# Patient Record
Sex: MALE | Race: WHITE
[De-identification: names, ages, dates, MRNs, and addresses within clinical notes are randomized per-mention and may not be internally consistent; named-entity substitution may affect disease eponyms.]

---

## 2018-08-22 ENCOUNTER — HOSPITAL ENCOUNTER (EMERGENCY)
Dept: HOSPITAL 25 - ED | Age: 28
Discharge: HOME | End: 2018-08-22
Payer: SELF-PAY

## 2018-08-22 DIAGNOSIS — F17.200: ICD-10-CM

## 2018-08-22 DIAGNOSIS — F19.129: Primary | ICD-10-CM

## 2018-08-22 LAB
BASOPHILS # BLD AUTO: 0.1 10^3/UL (ref 0–0.2)
EOSINOPHIL # BLD AUTO: 0.1 10^3/UL (ref 0–0.6)
HCT VFR BLD AUTO: 45 % (ref 42–52)
HGB BLD-MCNC: 15.1 G/DL (ref 14–18)
LYMPHOCYTES # BLD AUTO: 2.9 10^3/UL (ref 1–4.8)
MCH RBC QN AUTO: 31 PG (ref 27–31)
MCHC RBC AUTO-ENTMCNC: 34 G/DL (ref 31–36)
MCV RBC AUTO: 91 FL (ref 80–94)
MONOCYTES # BLD AUTO: 0.7 10^3/UL (ref 0–0.8)
NEUTROPHILS # BLD AUTO: 5.3 10^3/UL (ref 1.5–7.7)
NRBC # BLD AUTO: 0 10^3/UL
NRBC BLD QL AUTO: 0.1
PLATELET # BLD AUTO: 311 10^3/UL (ref 150–450)
RBC # BLD AUTO: 4.91 10^6/UL (ref 4–5.4)
WBC # BLD AUTO: 9.1 10^3/UL (ref 3.5–10.8)

## 2018-08-22 PROCEDURE — 80053 COMPREHEN METABOLIC PANEL: CPT

## 2018-08-22 PROCEDURE — 99284 EMERGENCY DEPT VISIT MOD MDM: CPT

## 2018-08-22 PROCEDURE — 80320 DRUG SCREEN QUANTALCOHOLS: CPT

## 2018-08-22 PROCEDURE — 80329 ANALGESICS NON-OPIOID 1 OR 2: CPT

## 2018-08-22 PROCEDURE — 36415 COLL VENOUS BLD VENIPUNCTURE: CPT

## 2018-08-22 PROCEDURE — 84443 ASSAY THYROID STIM HORMONE: CPT

## 2018-08-22 PROCEDURE — G0480 DRUG TEST DEF 1-7 CLASSES: HCPCS

## 2018-08-22 PROCEDURE — 85025 COMPLETE CBC W/AUTO DIFF WBC: CPT

## 2018-08-22 NOTE — UC
Psychiatric Complaint HPI





- History Of Current Complaint


Stated Complaint: 941


Time Seen by Provider: 08/22/18 12:25





PMH/Surg Hx/FS Hx/Imm Hx





- Social History


Alcohol Use: None


Substance Use Type: None


Smoking Status (MU): Heavy Every Day Tobacco Smoker





Physical Exam


Triage Information Reviewed: Yes


Vital Signs Reviewed: Yes





Discharge





- Discharge Plan


Referrals: 


No Primary Care Phys,NOPCP [Primary Care Provider] - 





- Attestation Statements


Document Initiated by Scribe: Yes


Documenting Scribe: Felicia Ricci


Provider For Whom Scribe is Documenting (Include Credential): Boris Garcia MD


Scribe Attestation: 


IFelicia, scribed for Boris Garcia MD on 08/22/18 at 1229. 


Scribe Documentation Reviewed: Yes


Provider Attestation: 


The documentation as recorded by the Felicia canada accurately reflects 

the service I personally performed and the decisions made by me, Boris Garcia MD

## 2018-08-22 NOTE — ED
Psychiatric Complaint





- HPI Summary


HPI Summary: 


The patient is a 27 y/o M [facility] [employer] presenting to [facility] with a chief 

complaint of EtOH intoxication, possible cocaine and marijuana use, and 

potential HI. Per police report, the pt had been running around the streets in 

front of cars and screaming. There were multiple reports called in for him, but 

he was unable to be caught until he was home and the pt's roommate called after 

he was throwing things around the house and stated he wanted to hurt his 

roommate. When police presented themselves in the pt's home, he ran out of the 

house. The pt had gotten to his neighbor's house, where he was once again 

throwing things, although he was compliant when handcuffed. Pt states that when 

he woke up this morning, his cousin/roommate came into his room and told him 

that he had to gather his things and leave the house. The pt went to work as he 

was supposed to, but he was assigned a job he did not want to do, causing him 

to become more upset. Because of this, he decided to pour tequila into his 

coffee, allowing him to become EtOH intoxicated. He also reports taking 

marijuana and cocaine but did not make it clear if that was today. He states 

that he does not want to hurt himself or anyone else. He also reports scars all 

over his body.





- History Of Current Complaint


Chief Complaint: EDMentalHealth


Time Seen by Provider: 08/22/18 12:25


Hx Obtained From: Patient


Onset/Duration: Sudden Onset, Lasting Hours, Still Present


Timing: Hours


Severity Initially: Severe


Severity Currently: Severe


Character: Manic


Aggravating Factor(s): Alcohol Use, Drug Use - cocaine, marijuana


Alleviating Factor(s): Nothing


Associated Signs And Symptoms: Positive: Hostile


Has Suicidal: Denies: Thoughts


Has Homicidal: Denies: Thoughts, With A Plan - but per police report, pt was 

having HI


Recent Stressor(s): told he had to leave his house


Ingestion History: Type/Name Of Drug - cocaine, marijuana





- Allergies/Home Medications


Allergies/Adverse Reactions: 


 Allergies











Allergy/AdvReac Type Severity Reaction Status Date / Time


 


No Known Allergies Allergy   Verified 08/22/18 13:08











Home Medications: 


 Home Medications





Unobtainable  08/22/18 [History Confirmed 08/22/18]











PMH/Surg Hx/FS Hx/Imm Hx


Opthamlomology History: 


   Denies: Hx Legally Blind


EENT History: 


   Denies: Hx Deafness





- Immunization History


Immunizations Up to Date: Unable to Obtain/Confirm


Infectious Disease History: Unable to Obtain/Confirm


Infectious Disease History: 


   Denies: Traveled Outside the [place] in Last 30 Days





- Family History


Known Family History: 


   Negative: Hypertension





- Social History


Alcohol Use: None


Substance Use Type: Reports: Cocaine, Marijuana, Other


Substance Use Comment - Amount & Last Used: (+)Alcohol


Smoking Status (MU): Heavy Every Day Tobacco Smoker





Review of Systems


Positive: Other - scars all over body


Neurological: Other - EtOH intoxication


Positive: Other - HI, marijuana and cocaine use


All Other Systems Reviewed And Are Negative: Yes





Physical Exam





- Summary


Physical Exam Summary: 


Constitutional: Well-developed, Well-nourished, Alert. (-) Distressed


Skin: Warm, Dry


HENT: Normocephalic; Atraumatic


Eyes: Conjunctiva normal


Neck: Musculoskeletal ROM normal neck. (-) JVD, (-) Stridor, (-) Tracheal 

deviation


Cardio: Rhythm regular, rate normal, Heart sounds normal; Intact distal pulses; 

The pedal pulses are 2+ and symmetric. Radial pulses are 2+ and symmetric. (-) 

Murmur


Pulmonary/Chest wall: Effort normal. (-) Respiratory distress, (-) Wheezes, (-) 

Rales


Abd: Soft, (-) epigastric tenderness, (-) Distension, (-) Guarding, (-) Rebound


Musculoskeletal: (-) Edema


Lymph: (-) Cervical adenopathy


Neuro: Alert, Oriented x3


Psych: Mood and affect Normal


Triage Information Reviewed: Yes


Vital Signs On Initial Exam: 


 Initial Vitals











Pulse Resp BP Pulse Ox


 


 79   16   101/62   93 


 


 08/22/18 13:07  08/22/18 13:07  08/22/18 13:07  08/22/18 13:07











Vital Signs Reviewed: Yes





Diagnostics





- Vital Signs


 Vital Signs











  Temp Pulse Resp BP Pulse Ox


 


 08/22/18 13:56   82   98/81  98


 


 08/22/18 13:55   87   108/83  97


 


 08/22/18 13:42   97   121/68  98


 


 08/22/18 13:29   82    93


 


 08/22/18 13:28   83   96/57  92


 


 08/22/18 13:09  98 F  83  18  101/62  93


 


 08/22/18 13:07   79  16  101/62  93














- Laboratory


Lab Results: 


 Lab Results











  08/22/18 08/22/18 Range/Units





  12:51 12:51 


 


WBC  9.1   (3.5-10.8)  10^3/ul


 


RBC  4.91   (4.00-5.40)  10^6/ul


 


Hgb  15.1   (14.0-18.0)  g/dl


 


Hct  45   (42-52)  %


 


MCV  91   (80-94)  fL


 


MCH  31   (27-31)  pg


 


MCHC  34   (31-36)  g/dl


 


RDW  14   (10.5-15)  %


 


Plt Count  311   (150-450)  10^3/ul


 


MPV  7.3 L   (7.4-10.4)  um3


 


Neut % (Auto)  58.6   (38-83)  %


 


Lymph % (Auto)  31.4   (25-47)  %


 


Mono % (Auto)  8.2 H   (0-7)  %


 


Eos % (Auto)  1.1   (0-6)  %


 


Baso % (Auto)  0.7   (0-2)  %


 


Absolute Neuts (auto)  5.3   (1.5-7.7)  10^3/ul


 


Absolute Lymphs (auto)  2.9   (1.0-4.8)  10^3/ul


 


Absolute Monos (auto)  0.7   (0-0.8)  10^3/ul


 


Absolute Eos (auto)  0.1   (0-0.6)  10^3/ul


 


Absolute Basos (auto)  0.1   (0-0.2)  10^3/ul


 


Absolute Nucleated RBC  0   10^3/ul


 


Nucleated RBC %  0.1   


 


Sodium   142  (135-145)  mmol/L


 


Potassium   3.7  (3.5-5.0)  mmol/L


 


Chloride   109  (101-111)  mmol/L


 


Carbon Dioxide   24  (22-32)  mmol/L


 


Anion Gap   9  (2-11)  mmol/L


 


BUN   10  (6-24)  mg/dL


 


Creatinine   0.92  (0.67-1.17)  mg/dL


 


Est GFR ( Amer)   117.7  (>60)  


 


Est GFR (Non-Af Amer)   97.3  (>60)  


 


BUN/Creatinine Ratio   10.9  (8-20)  


 


Glucose   95  ()  mg/dL


 


Calcium   9.6  (8.6-10.3)  mg/dL


 


Total Bilirubin   0.50  (0.2-1.0)  mg/dL


 


AST   35  (13-39)  U/L


 


ALT   23  (7-52)  U/L


 


Alkaline Phosphatase   65  ()  U/L


 


Total Protein   7.0  (6.4-8.9)  g/dL


 


Albumin   4.4  (3.2-5.2)  g/dL


 


Globulin   2.6  (2-4)  g/dL


 


Albumin/Globulin Ratio   1.7  (1-3)  


 


TSH   Pending  


 


Salicylates   < 2.50  (<30)  mg/dL


 


Acetaminophen   < 15  mcg/mL


 


Serum Alcohol   292 H  (<10)  mg/dL











Result Diagrams: 


 08/22/18 12:51





 08/22/18 12:51


Lab Statement: Any lab studies that have been ordered have been reviewed, and 

results considered in the medical decision making process.





Re-Evaluation





- Re-Evaluation


  ** First Eval


Re-Evaluation Time: 13:00


Change: Unchanged


Comment: Patient is now calm and has responded well to medication. I asked that 

the restraints be removed at 13:15.





  ** Second Eval


Re-Evaluation Time: 13:30


Change: Unchanged


Comment: Coming time to remove restraints, the patient is not cooperative and 

appears to be agitated. The restraints will be continued.





  ** Third Eval


Re-Evaluation Time: 17:30


Change: Improved


Comment: Patient seems comfortable. The decision has been made to remove the 

restraints.





  ** Fourth Eval


Re-Evaluation Time: 18:05


Change: Unchanged


Comment: Patient is sleepy and unresponsive.





Course/Dx





- Course


Course Of Treatment: The patient is a 27 y/o M [facility] [employer] presenting to 

[facility] with a chief complaint of EtOH intoxication, possible cocaine and 

marijuana use, and potential HI. Per police report, the pt had been running 

around the streets in front of cars and screaming. There were multiple reports 

called in for him, but he was unable to be caught until he was home and the pt'

s roommate called after he was throwing things around the house and stated he 

wanted to hurt his roommate. When police presented themselves in the pt's home, 

he ran out of the house. The pt had gotten to his neighbor's house, where he 

was once again throwing things, although he was compliant when handcuffed. Pt 

states that when he woke up this morning, his cousin/roommate came into his 

room and told him that he had to gather his things and leave the house. The pt 

went to work as he was supposed to, but he was assigned a job he did not want 

to do, causing him to become more upset. Because of this, he decided to pour 

tequila into his coffee, allowing him to become EtOH intoxicated. He also 

reports taking marijuana and cocaine but did not make it clear if that was 

today. He states that he does not want to hurt himself or anyone else. He also 

reports scars all over his body.  Upon arrival, pt is obviously upset and 

somewhat hostile with swearing. In the ED course, he was administered Ativan 

and Haldol.  Pt was put in ankle and wrist restraints at 1235. Restraints were 

to be taken off at 1315 but pt was not cooperative. Restraints removed 

officially at 17:30. Patient will be a sign-out to Dr. Grimes at shift change 

pending disposition after [facility].





Discharge





- Sign-Out/Discharge


Documenting (check all that apply): Sign-Out Patient - Patient will be a sign-

out to Dr. Grimes at shift change pending disposition after [facility]HU.


Signing out patient TO: Eduard Grimes





- Discharge Plan


Referrals: 


No Primary Care Phys,NOPCP [Primary Care Provider] - 





- Attestation Statements


Document Initiated by Gavinibe: Yes


Documenting Scribe: Felicia Ricci


Provider For Whom Klaus is Documenting (Include Credential): Boris Garcai MD


Scribe Attestation: 


I, Felicia Ricci, scribed for Boris Garcia MD on 08/22/18 at 1912. 


Scribe Documentation Reviewed: Yes


Provider Attestation: 


The documentation as recorded by the Felicia canada accurately reflects 

the service I personally performed and the decisions made by me, Boris Garcia MD

## 2018-08-23 VITALS — SYSTOLIC BLOOD PRESSURE: 100 MMHG | DIASTOLIC BLOOD PRESSURE: 68 MMHG

## 2018-08-26 NOTE — ED
Progress





- Consult/PCP


Time Called: 20:00





Re-Evaluation





- Re-Evaluation


  ** First Eval


Re-Evaluation Time: 13:00


Change: Unchanged


Comment: Patient is now calm and has responded well to medication. I asked that 

the restraints be removed at 13:15.





  ** Second Eval


Re-Evaluation Time: 13:30


Change: Unchanged


Comment: Coming time to remove restraints, the patient is not cooperative and 

appears to be agitated. The restraints will be continued.





  ** Third Eval


Re-Evaluation Time: 17:30


Change: Improved


Comment: Patient seems comfortable. The decision has been made to remove the 

restraints.





  ** Fourth Eval


Re-Evaluation Time: 18:05


Change: Unchanged


Comment: Patient is sleepy and unresponsive.





Course/Dx





- Course


Course Of Treatment: The patient is a 29 y/o M [facility] [employer] presenting to 

[facility] with a chief complaint of EtOH intoxication, possible cocaine and 

marijuana use, and potential HI. Per police report, the pt had been running 

around the streets in front of cars and screaming. There were multiple reports 

called in for him, but he was unable to be caught until he was home and the pt'

s roommate called after he was throwing things around the house and stated he 

wanted to hurt his roommate. When police presented themselves in the pt's home, 

he ran out of the house. The pt had gotten to his neighbor's house, where he 

was once again throwing things, although he was compliant when handcuffed. Pt 

states that when he woke up this morning, his cousin/roommate came into his 

room and told him that he had to gather his things and leave the house. The pt 

went to work as he was supposed to, but he was assigned a job he did not want 

to do, causing him to become more upset. Because of this, he decided to pour 

tequila into his coffee, allowing him to become EtOH intoxicated. He also 

reports taking marijuana and cocaine but did not make it clear if that was 

today. He states that he does not want to hurt himself or anyone else. He also 

reports scars all over his body.  Upon arrival, pt is obviously upset and 

somewhat hostile with swearing. In the ED course, he was administered Ativan 

and Haldol.  Pt was put in ankle and wrist restraints at 1235. Restraints were 

to be taken off at 1315 but pt was not cooperative. Restraints removed 

officially at 17:30. Patient will be a sign-out to Dr. Grimes at shift change 

pending disposition after [facility]HU.





- Diagnoses


Provider Diagnoses: 


 Polysubstance abuse








Discharge





- Sign-Out/Discharge


Documenting (check all that apply): Patient Departure





- Discharge Plan


Condition: Good


Disposition: HOME


Referrals: 


No Primary Care Phys,NOPCP [Primary Care Provider] - 





- Billing Disposition and Condition


Condition: GOOD


Disposition: Home





- Attestation Statements


Document Initiated by Scribe: No

## 2018-11-01 ENCOUNTER — HOSPITAL ENCOUNTER (EMERGENCY)
Dept: HOSPITAL 25 - ED | Age: 28
LOS: 1 days | Discharge: HOME | End: 2018-11-02
Payer: SELF-PAY

## 2018-11-01 DIAGNOSIS — F19.90: Primary | ICD-10-CM

## 2018-11-01 DIAGNOSIS — F17.200: ICD-10-CM

## 2018-11-01 LAB
BASOPHILS # BLD AUTO: 0.1 10^3/UL (ref 0–0.2)
EOSINOPHIL # BLD AUTO: 0.1 10^3/UL (ref 0–0.6)
HCT VFR BLD AUTO: 42 % (ref 42–52)
HGB BLD-MCNC: 14.3 G/DL (ref 14–18)
LYMPHOCYTES # BLD AUTO: 3.9 10^3/UL (ref 1–4.8)
MCH RBC QN AUTO: 31 PG (ref 27–31)
MCHC RBC AUTO-ENTMCNC: 34 G/DL (ref 31–36)
MCV RBC AUTO: 92 FL (ref 80–94)
MONOCYTES # BLD AUTO: 1.3 10^3/UL (ref 0–0.8)
NEUTROPHILS # BLD AUTO: 4.9 10^3/UL (ref 1.5–7.7)
NRBC # BLD AUTO: 0 10^3/UL
NRBC BLD QL AUTO: 0
PLATELET # BLD AUTO: 330 10^3/UL (ref 150–450)
RBC # BLD AUTO: 4.63 10^6/UL (ref 4–5.4)
WBC # BLD AUTO: 10.3 10^3/UL (ref 3.5–10.8)

## 2018-11-01 PROCEDURE — 99285 EMERGENCY DEPT VISIT HI MDM: CPT

## 2018-11-01 PROCEDURE — 36415 COLL VENOUS BLD VENIPUNCTURE: CPT

## 2018-11-01 PROCEDURE — 85025 COMPLETE CBC W/AUTO DIFF WBC: CPT

## 2018-11-01 PROCEDURE — 84443 ASSAY THYROID STIM HORMONE: CPT

## 2018-11-01 PROCEDURE — 80053 COMPREHEN METABOLIC PANEL: CPT

## 2018-11-01 PROCEDURE — 80307 DRUG TEST PRSMV CHEM ANLYZR: CPT

## 2018-11-01 PROCEDURE — G0480 DRUG TEST DEF 1-7 CLASSES: HCPCS

## 2018-11-01 PROCEDURE — 81003 URINALYSIS AUTO W/O SCOPE: CPT

## 2018-11-01 PROCEDURE — 80329 ANALGESICS NON-OPIOID 1 OR 2: CPT

## 2018-11-01 PROCEDURE — 80320 DRUG SCREEN QUANTALCOHOLS: CPT

## 2018-11-01 NOTE — ED
Psychiatric Complaint





- HPI Summary


HPI Summary: 


Level  5 Caveat:  Unable to obtain complete HPI due to AMS secondary to EtOH 

use 





The pt is a 27 y/o male BIBA to [facility] c/o SI since today evening.  The pt got 

into an argument with his cousin who called the police and ambulance after the 

pt broke glass and threatened to kill himself  if not given crack. The pt 

reports consumption of 6-10 alcoholic drinks, smoking marijuana and crack 

today. He lives with his cousin in [place] and has a brother and sister in the 

area. He notes palpitations but denies SI/HI at bedside. Pt insists on going 

home. 





- History Of Current Complaint


Chief Complaint: EDMentalHealth


Time Seen by Provider: 11/01/18 20:53


Hx Obtained From: Patient


Hx From Patient Unobtainable Due To: Altered Mental Status


Onset/Duration: Sudden Onset, Still Present


Timing: Constant


Character: Manic


Aggravating Factor(s): Alcohol Use, Drug Use


Alleviating Factor(s): Nothing


Has Suicidal: Reports: Thoughts - Denies at bedside.  Denies: With A Plan


Has Homicidal: Denies: Thoughts, With A Plan


Ingestion History: Type/Name Of Drug - EtOH (6-10 botttles, "crack" and 

marijuana





- Allergies/Home Medications


Allergies/Adverse Reactions: 


 Allergies











Allergy/AdvReac Type Severity Reaction Status Date / Time


 


No Known Allergies Allergy   Verified 08/22/18 13:08














PMH/Surg Hx/FS Hx/Imm Hx


Previously Healthy: No - Level  5 Caveat:  Unable to obtain complete Mhx due to 

AMS secondary to EtO


Endocrine/Hematology History: 


   Denies: Hx Diabetes


Cardiovascular History: 


   Denies: Hx Hypertension


Respiratory History: 


   Denies: Hx Asthma


Sensory History: 


   Denies: Hx Legally Blind, Hx Deafness


Opthamlomology History: 


   Denies: Hx Legally Blind


Psychiatric History: Reports: Hx of Violent Episodes Against Others


   Denies: Hx Eating Disorder





- Cancer History


Cancer Type, Location and Year: None reported


Infectious Disease History: No


Infectious Disease History: 


   Denies: Traveled Outside the [place] in Last 30 Days





- Family History


Known Family History: 


   Negative: Hypertension





- Social History


Occupation: Employed Full-time


Lives: With Family - Cousin


Alcohol Use: Daily


Hx Substance Use: Yes


Substance Use Type: Reports: Cocaine, Marijuana


Substance Use Comment - Amount & Last Used: marijuana every day and crack every 

weekend


Smoking Status (MU): Heavy Every Day Tobacco Smoker





Review of Systems





- ROS Summary


Review of Systems Summary: 


Level  5 Caveat:  Unable to obtain complete ROS due to AMS secondary to EtOH 

use 


Positive: Palpitations


Positive: Other - Negative: SI/HI 


All Other Systems Reviewed And Are Negative: No





Physical Exam





- Summary


Physical Exam Summary: 


Level  5 Caveat:  Unable to obtain complete PE due to AMS secondary to EtOH use 





GENERAL:  Patient is a well developed and nourished M who is lying comfortable 

in the stretcher.  Patient is not in any acute respiratory distress.


HEAD AND FACE: Normocephalic


EYES: PERRLA, EOMI x 2.


EARS: Hearing grossly intact.


MOUTH: Oropharynx within normal limits.


NECK: Supple, trachea is midline, no adenopathy, no JVD, no carotid bruit.


CHEST: Symmetric, no tenderness at palpation


LUNGS: Clear to auscultation bilaterally. No wheezing or crackles.


CVS: Regular rate and rhythm, S1 and S2 present, no murmurs or gallops 

appreciated.


ABDOMEN: Soft, non-tender. Bowel sounds are normal. No abdominal abnormal 

pulsations.


EXTREMITIES: Full ROM in all major joints, no edema, no cyanosis or clubbing.


NEURO: Alert and oriented x 3. No acute neurological deficits. Speech is normal 

and follows commands.


SKIN: Dry and warm


PSYCH: Patient appears manic with linear thought and pressured speech.


Triage Information Reviewed: Yes


Vital Signs On Initial Exam: 


 Initial Vitals











Temp Pulse Resp BP Pulse Ox


 


 98.0 F   71   16   109/78   100 


 


 11/01/18 20:44  11/01/18 20:44  11/01/18 20:44  11/01/18 20:44  11/01/18 20:44











Vital Signs Reviewed: Yes


Completion Of Physical Exam Limited Due To: Altered Mental Status





Diagnostics





- Vital Signs


 Vital Signs











  Temp Pulse Resp BP Pulse Ox


 


 11/01/18 20:44  98.0 F  71  16  109/78  100














- Laboratory


Lab Results: 


 Lab Results











  11/01/18 11/01/18 11/01/18 Range/Units





  21:04 21:04 21:09 


 


WBC    10.3  (3.5-10.8)  10^3/ul


 


RBC    4.63  (4.00-5.40)  10^6/ul


 


Hgb    14.3  (14.0-18.0)  g/dl


 


Hct    42  (42-52)  %


 


MCV    92  (80-94)  fL


 


MCH    31  (27-31)  pg


 


MCHC    34  (31-36)  g/dl


 


RDW    14  (10.5-15)  %


 


Plt Count    330  (150-450)  10^3/ul


 


MPV    7.5  (7.4-10.4)  um3


 


Neut % (Auto)    47.6  (38-83)  %


 


Lymph % (Auto)    37.9  (25-47)  %


 


Mono % (Auto)    13.0 H  (0-7)  %


 


Eos % (Auto)    0.9  (0-6)  %


 


Baso % (Auto)    0.6  (0-2)  %


 


Absolute Neuts (auto)    4.9  (1.5-7.7)  10^3/ul


 


Absolute Lymphs (auto)    3.9  (1.0-4.8)  10^3/ul


 


Absolute Monos (auto)    1.3 H  (0-0.8)  10^3/ul


 


Absolute Eos (auto)    0.1  (0-0.6)  10^3/ul


 


Absolute Basos (auto)    0.1  (0-0.2)  10^3/ul


 


Absolute Nucleated RBC    0  10^3/ul


 


Nucleated RBC %    0  


 


Sodium     (135-145)  mmol/L


 


Potassium     (3.5-5.0)  mmol/L


 


Chloride     (101-111)  mmol/L


 


Carbon Dioxide     (22-32)  mmol/L


 


Anion Gap     (2-11)  mmol/L


 


BUN     (6-24)  mg/dL


 


Creatinine     (0.67-1.17)  mg/dL


 


Est GFR ( Amer)     (>60)  


 


Est GFR (Non-Af Amer)     (>60)  


 


BUN/Creatinine Ratio     (8-20)  


 


Glucose     ()  mg/dL


 


Calcium     (8.6-10.3)  mg/dL


 


Total Bilirubin     (0.2-1.0)  mg/dL


 


AST     (13-39)  U/L


 


ALT     (7-52)  U/L


 


Alkaline Phosphatase     ()  U/L


 


Total Protein     (6.4-8.9)  g/dL


 


Albumin     (3.2-5.2)  g/dL


 


Globulin     (2-4)  g/dL


 


Albumin/Globulin Ratio     (1-3)  


 


TSH     


 


Urine Color  Straw    


 


Urine Appearance  Clear    


 


Urine pH  6.0    (5-9)  


 


Ur Specific Gravity  1.006 L    (1.010-1.030)  


 


Urine Protein  Negative    (Negative)  


 


Urine Ketones  Negative    (Negative)  


 


Urine Blood  Negative    (Negative)  


 


Urine Nitrate  Negative    (Negative)  


 


Urine Bilirubin  Negative    (Negative)  


 


Urine Urobilinogen  Negative    (Negative)  


 


Ur Leukocyte Esterase  Negative    (Negative)  


 


Urine Glucose  Negative    (Negative)  


 


Salicylates     (<30)  mg/dL


 


Urine Opiates Screen   None detected   (None Detect)  


 


Acetaminophen     mcg/mL


 


Ur Barbiturates Screen   None detected   (None Detect)  


 


Ur Phencyclidine Scrn   None detected   (None Detect)  


 


Ur Amphetamines Screen   None detected   (None Detect)  


 


U Benzodiazepines Scrn   None detected   (None Detect)  


 


Urine Cocaine Screen   Presumptive positive A   (None Detect)  


 


U Cannabinoids Screen   Presumptive positive A   (None Detect)  


 


Serum Alcohol     (<10)  mg/dL














  11/01/18 Range/Units





  21:09 


 


WBC   (3.5-10.8)  10^3/ul


 


RBC   (4.00-5.40)  10^6/ul


 


Hgb   (14.0-18.0)  g/dl


 


Hct   (42-52)  %


 


MCV   (80-94)  fL


 


MCH   (27-31)  pg


 


MCHC   (31-36)  g/dl


 


RDW   (10.5-15)  %


 


Plt Count   (150-450)  10^3/ul


 


MPV   (7.4-10.4)  um3


 


Neut % (Auto)   (38-83)  %


 


Lymph % (Auto)   (25-47)  %


 


Mono % (Auto)   (0-7)  %


 


Eos % (Auto)   (0-6)  %


 


Baso % (Auto)   (0-2)  %


 


Absolute Neuts (auto)   (1.5-7.7)  10^3/ul


 


Absolute Lymphs (auto)   (1.0-4.8)  10^3/ul


 


Absolute Monos (auto)   (0-0.8)  10^3/ul


 


Absolute Eos (auto)   (0-0.6)  10^3/ul


 


Absolute Basos (auto)   (0-0.2)  10^3/ul


 


Absolute Nucleated RBC   10^3/ul


 


Nucleated RBC %   


 


Sodium  141  (135-145)  mmol/L


 


Potassium  3.8  (3.5-5.0)  mmol/L


 


Chloride  111  (101-111)  mmol/L


 


Carbon Dioxide  23  (22-32)  mmol/L


 


Anion Gap  7  (2-11)  mmol/L


 


BUN  14  (6-24)  mg/dL


 


Creatinine  0.93  (0.67-1.17)  mg/dL


 


Est GFR ( Amer)  116.2  (>60)  


 


Est GFR (Non-Af Amer)  96.1  (>60)  


 


BUN/Creatinine Ratio  15.1  (8-20)  


 


Glucose  96  ()  mg/dL


 


Calcium  9.3  (8.6-10.3)  mg/dL


 


Total Bilirubin  0.40  (0.2-1.0)  mg/dL


 


AST  26  (13-39)  U/L


 


ALT  15  (7-52)  U/L


 


Alkaline Phosphatase  65  ()  U/L


 


Total Protein  6.9  (6.4-8.9)  g/dL


 


Albumin  4.3  (3.2-5.2)  g/dL


 


Globulin  2.6  (2-4)  g/dL


 


Albumin/Globulin Ratio  1.7  (1-3)  


 


TSH  Pending  


 


Urine Color   


 


Urine Appearance   


 


Urine pH   (5-9)  


 


Ur Specific Gravity   (1.010-1.030)  


 


Urine Protein   (Negative)  


 


Urine Ketones   (Negative)  


 


Urine Blood   (Negative)  


 


Urine Nitrate   (Negative)  


 


Urine Bilirubin   (Negative)  


 


Urine Urobilinogen   (Negative)  


 


Ur Leukocyte Esterase   (Negative)  


 


Urine Glucose   (Negative)  


 


Salicylates  < 2.50  (<30)  mg/dL


 


Urine Opiates Screen   (None Detect)  


 


Acetaminophen  < 15  mcg/mL


 


Ur Barbiturates Screen   (None Detect)  


 


Ur Phencyclidine Scrn   (None Detect)  


 


Ur Amphetamines Screen   (None Detect)  


 


U Benzodiazepines Scrn   (None Detect)  


 


Urine Cocaine Screen   (None Detect)  


 


U Cannabinoids Screen   (None Detect)  


 


Serum Alcohol  206 H  (<10)  mg/dL











Result Diagrams: 


 11/01/18 21:09





 11/01/18 21:09


Lab Statement: Any lab studies that have been ordered have been reviewed, and 

results considered in the medical decision making process.





Course/Dx





- Course


Course Of Treatment: A 28 year-old M presents to the ED with a CC of SI since 

today evening.  The pt got into an argument with his cousin who called the 

police and ambulance after the pt broke glass and threatened to kill himself  

if not given crack. The pt reports consumption of 6-10 alcoholic drinks, 

smoking marijuana and crack today. He notes palpitations but denies SI/HI. Pt 

insists on going home.  A physical exam revealed a manic affect with linear 

thought and pressured speech.  In the ED course, pt was given Nicotine 10 mg 

INH  which improved the symptoms. I discussed the care of the pt with Dr. Le 

who agreed to see the pt in the ED in the morning. Patient will be signed out 

to Dr. Leobardo MD  at the change of shift with a preliminary Dx of substance 

use disorder.





- Differential Dx/Clinical Impression


Provider Diagnosis: 


 Substance use disorder








- Physician Notifications


Discussed Care Of Patient With: Yanick Le - Psychiatrist


Time Discussed With Above Provider: 04:45


Instructed by Provider To: MD Will See In ED





Discharge





- Sign-Out/Discharge


Documenting (check all that apply): Sign-Out Patient


Signing out patient TO: Bruce Booth - 07:00 hrs 





- Discharge Plan


Condition: Stable


Referrals: 


No Primary Care Phys,NOPCP [Primary Care Provider] - 





- Billing Disposition and Condition


Condition: STABLE





- Attestation Statements


Document Initiated by Scribe: Yes


Documenting Scribe: Jennyfer Reyes


Provider For Whom Scribe is Documenting (Include Credential): Dr. Vic Jolly MD


Scribe Attestation: 


IJennyfer, scribed for Dr. Vic Jolly MD on 11/02/18 at 0552. 


Scribe Documentation Reviewed: Yes


Provider Attestation: 


The documentation as recorded by the scribJennyfer hernandez accurately 

reflects the service I personally performed and the decisions made by me, Dr. Vic Jolly MD

## 2018-11-02 VITALS — DIASTOLIC BLOOD PRESSURE: 81 MMHG | SYSTOLIC BLOOD PRESSURE: 121 MMHG

## 2018-11-02 NOTE — ED
Progress





- Progress Note


Progress Note: 


Patient is received as a sign out from Dr. Jolly to Dr. Booth at 0700 11/02/ 18 shift change pending evaluation by psychiatrist. 





0803 - Dr. Le has reviewed the patient's case. Dr. Le states patient can 

be discharged to home with diagnosis of substance induced mood disorder. Dr. Booth is agreeable with this plan. 








Course/Dx





- Diagnoses


Provider Diagnoses: 


 Substance induced mood disorder








- Provider Notifications


Discussed Care Of Patient With: Yanick Le


Time Discussed With Above Provider: 08:03


Instructed by Provider To: Other - 0803 - Dr. Le has reviewed the patient's 

case. Dr. Le states patient can be discharged to home with diagnosis of 

substance induced mood disorder. Dr. Booth is agreeable with this plan.





Discharge





- Sign-Out/Discharge


Documenting (check all that apply): Patient Departure - discharge 





- Discharge Plan


Condition: Stable


Disposition: HOME


Referrals: 


No Primary Care Phys,NOPCP [Primary Care Provider] - 





- Attestation Statements


Document Initiated by Scribe: Yes


Documenting Scribe: Yariel Vasques


Provider For Whom Scribe is Documenting (Include Credential): Bruce Booth MD


Scribe Attestation: 


Yariel RAMIREZ, scribed for Bruce Booth MD on 11/02/18 at 0812.